# Patient Record
Sex: FEMALE | Race: WHITE | Employment: FULL TIME | URBAN - NONMETROPOLITAN AREA
[De-identification: names, ages, dates, MRNs, and addresses within clinical notes are randomized per-mention and may not be internally consistent; named-entity substitution may affect disease eponyms.]

---

## 2020-03-03 ENCOUNTER — TELEPHONE (OUTPATIENT)
Dept: FAMILY MEDICINE CLINIC | Age: 25
End: 2020-03-03

## 2020-03-03 ENCOUNTER — OFFICE VISIT (OUTPATIENT)
Dept: FAMILY MEDICINE CLINIC | Age: 25
End: 2020-03-03
Payer: COMMERCIAL

## 2020-03-03 VITALS
SYSTOLIC BLOOD PRESSURE: 134 MMHG | WEIGHT: 161.6 LBS | OXYGEN SATURATION: 99 % | BODY MASS INDEX: 23.93 KG/M2 | DIASTOLIC BLOOD PRESSURE: 82 MMHG | HEART RATE: 74 BPM | HEIGHT: 69 IN | TEMPERATURE: 98.3 F

## 2020-03-03 PROCEDURE — 1036F TOBACCO NON-USER: CPT | Performed by: FAMILY MEDICINE

## 2020-03-03 PROCEDURE — G8484 FLU IMMUNIZE NO ADMIN: HCPCS | Performed by: FAMILY MEDICINE

## 2020-03-03 PROCEDURE — G8420 CALC BMI NORM PARAMETERS: HCPCS | Performed by: FAMILY MEDICINE

## 2020-03-03 PROCEDURE — 99204 OFFICE O/P NEW MOD 45 MIN: CPT | Performed by: FAMILY MEDICINE

## 2020-03-03 PROCEDURE — G8427 DOCREV CUR MEDS BY ELIG CLIN: HCPCS | Performed by: FAMILY MEDICINE

## 2020-03-03 RX ORDER — MUPIROCIN CALCIUM 20 MG/G
CREAM TOPICAL
Qty: 15 G | Refills: 0 | Status: SHIPPED | OUTPATIENT
Start: 2020-03-03 | End: 2020-04-02

## 2020-03-03 SDOH — ECONOMIC STABILITY: TRANSPORTATION INSECURITY
IN THE PAST 12 MONTHS, HAS THE LACK OF TRANSPORTATION KEPT YOU FROM MEDICAL APPOINTMENTS OR FROM GETTING MEDICATIONS?: NO

## 2020-03-03 SDOH — ECONOMIC STABILITY: FOOD INSECURITY: WITHIN THE PAST 12 MONTHS, THE FOOD YOU BOUGHT JUST DIDN'T LAST AND YOU DIDN'T HAVE MONEY TO GET MORE.: NEVER TRUE

## 2020-03-03 SDOH — ECONOMIC STABILITY: TRANSPORTATION INSECURITY
IN THE PAST 12 MONTHS, HAS LACK OF TRANSPORTATION KEPT YOU FROM MEETINGS, WORK, OR FROM GETTING THINGS NEEDED FOR DAILY LIVING?: NO

## 2020-03-03 SDOH — ECONOMIC STABILITY: FOOD INSECURITY: WITHIN THE PAST 12 MONTHS, YOU WORRIED THAT YOUR FOOD WOULD RUN OUT BEFORE YOU GOT MONEY TO BUY MORE.: NEVER TRUE

## 2020-03-03 SDOH — ECONOMIC STABILITY: INCOME INSECURITY: HOW HARD IS IT FOR YOU TO PAY FOR THE VERY BASICS LIKE FOOD, HOUSING, MEDICAL CARE, AND HEATING?: NOT HARD AT ALL

## 2020-03-03 ASSESSMENT — PATIENT HEALTH QUESTIONNAIRE - PHQ9
SUM OF ALL RESPONSES TO PHQ9 QUESTIONS 1 & 2: 0
2. FEELING DOWN, DEPRESSED OR HOPELESS: 0
SUM OF ALL RESPONSES TO PHQ QUESTIONS 1-9: 0
1. LITTLE INTEREST OR PLEASURE IN DOING THINGS: 0
SUM OF ALL RESPONSES TO PHQ QUESTIONS 1-9: 0

## 2020-03-03 ASSESSMENT — ENCOUNTER SYMPTOMS
ABDOMINAL PAIN: 0
ABDOMINAL DISTENTION: 0
NAUSEA: 0
CONSTIPATION: 0
EYE DISCHARGE: 0
TROUBLE SWALLOWING: 0
VOICE CHANGE: 0
COLOR CHANGE: 0
SHORTNESS OF BREATH: 0
COUGH: 0
DIARRHEA: 0

## 2020-03-03 NOTE — TELEPHONE ENCOUNTER
[facility] calling ins won't cover Bactroban cream needs switched to ointment.  Ok to change per Dr Dayana Taylor

## 2020-03-03 NOTE — PROGRESS NOTES
Subjective:      Patient ID: Misha Austin is a 21 y.o. female who presents for:  Chief Complaint   Patient presents with    New Patient   [facility] - in [facility] 2019        Chest pain, sob, feeling of irregular heartbeat. No syncope, lightheadedness. She has had a 2D echo for this before. She has a stethoscope and she has been listening and she feels like she hears double beats and then pauses. She does not hear truly irregular beats. She does not feel irregular beats. There is no cardiac history relevant in the family. She does not smoke. L arm goes numb down to the pinky. No burning. Does not wake up with the symptoms. Not sure it happens with her chest pain. R handed. 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 She is noting she is getting crusting lesions in each side of her nose. She feels the crust off and they return. It is bilateral.  Is internal.  No bleeding. No obvious signs of infection. She notes she has a baseline history of anxiety and her mother and grandmother both have anxiety 2. She does note a number of the symptoms she has with her chest pain are associated with anxiety times and if she is active she forgets all about it and does not notice any discomfort. Current Outpatient Medications on File Prior to Visit   Medication Sig Dispense Refill    levonorgestrel-ethinyl estradiol (LUTERA) 0.1-20 MG-MCG per tablet Take 1 tablet by mouth daily. No current facility-administered medications on file prior to visit. No past medical history on file. No past surgical history on file.   Social History     Socioeconomic History    Marital status: Single     Spouse name: Not on file    Number of children: Not on file    Years of education: Not on file    Highest education level: Not on file   Occupational History    Not on file   Social Needs    Financial resource strain: Not hard at all   Pelon-Rafiq insecurity: S2 normal. No murmur. No friction rub. No gallop. No S3 sounds. Pulmonary:      Effort: Pulmonary effort is normal. No accessory muscle usage or respiratory distress. Breath sounds: Normal breath sounds. Chest:      Chest wall: No deformity. Abdominal:      General: There is no distension. Musculoskeletal:         General: No deformity. Cervical back: She exhibits normal range of motion, no tenderness and no deformity. Comments: Testing bilateral upper extremities for strength normal range of motion normal   Lymphadenopathy:      Cervical:      Right cervical: No superficial cervical adenopathy. Left cervical: No superficial cervical adenopathy. Upper Body:      Right upper body: No supraclavicular adenopathy. Left upper body: No supraclavicular adenopathy. Skin:     General: Skin is warm and dry. Findings: No bruising or rash. Nails: There is no clubbing. Neurological:      Mental Status: She is alert. Cranial Nerves: Cranial nerve deficit: CN II-XII GI without obvious deficit. Sensory: Sensory deficit: grossly intact for hands. Motor: No tremor. Atrophy: B UE and LE without atrophy. Abnormal muscle tone: B UE and LE have normal tone. Coordination: Coordination normal.      Gait: Gait normal.      Comments: Bilateral upper extremity sensation intact muscle strength normal   Psychiatric:         Attention and Perception: She is attentive. Mood and Affect: Mood is not anxious. Affect is not inappropriate. Speech: Speech normal.         Behavior: Behavior is not agitated. Behavior is cooperative. Judgment: Judgment normal.         No results found for this visit on 03/03/20. No results found for this or any previous visit (from the past 2016 hour(s)). Assessment:       Diagnosis Orders   1. Chest pain, unspecified type     2. Elevated BP without diagnosis of hypertension     3.  Nasal lesion  mupirocin (BACTROBAN) 2 % cream   4. Anxiety           No orders of the defined types were placed in this encounter. Plan:   Return in about 4 weeks (around 3/31/2020) for for review of outcome of today's recommendation. Patient Instructions   Increase exercise slowly to improve cardiovascular health    Log symptoms of anxiety and chest pain. Consider anxiety meds if needed    Recheck bp appt in 4 weeks    Treat nasal lesions as assumption of MRSA colonization from recent nursing rotations in training. Colton Gr M.D.

## 2020-10-01 ENCOUNTER — HOSPITAL ENCOUNTER (EMERGENCY)
Age: 25
Discharge: HOME | End: 2020-10-02
Attending: EMERGENCY MEDICINE
Payer: COMMERCIAL

## 2020-10-01 ENCOUNTER — APPOINTMENT (OUTPATIENT)
Dept: CT IMAGING | Age: 25
End: 2020-10-01
Payer: COMMERCIAL

## 2020-10-01 LAB
ALBUMIN SERPL-MCNC: 4.9 G/DL (ref 3.5–4.6)
ALP BLD-CCNC: 40 U/L (ref 40–130)
ALT SERPL-CCNC: 17 U/L (ref 0–33)
ANION GAP SERPL CALCULATED.3IONS-SCNC: 14 MEQ/L (ref 9–15)
AST SERPL-CCNC: 17 U/L (ref 0–35)
BASOPHILS ABSOLUTE: 0.1 K/UL (ref 0–0.2)
BASOPHILS RELATIVE PERCENT: 0.4 %
BILIRUB SERPL-MCNC: <0.2 MG/DL (ref 0.2–0.7)
BILIRUBIN URINE: NEGATIVE
BLOOD, URINE: NORMAL
BUN BLDV-MCNC: 12 MG/DL (ref 6–20)
CALCIUM SERPL-MCNC: 10 MG/DL (ref 8.5–9.9)
CHLORIDE BLD-SCNC: 101 MEQ/L (ref 95–107)
CLARITY: CLEAR
CO2: 24 MEQ/L (ref 20–31)
COLOR: YELLOW
CREAT SERPL-MCNC: 0.87 MG/DL (ref 0.5–0.9)
EOSINOPHILS ABSOLUTE: 0.5 K/UL (ref 0–0.7)
EOSINOPHILS RELATIVE PERCENT: 4.2 %
EPITHELIAL CELLS, UA: NORMAL /HPF
GFR AFRICAN AMERICAN: >60
GFR NON-AFRICAN AMERICAN: >60
GLOBULIN: 3.1 G/DL (ref 2.3–3.5)
GLUCOSE BLD-MCNC: 94 MG/DL (ref 70–99)
GLUCOSE URINE: NEGATIVE MG/DL
HCG QUALITATIVE: NEGATIVE
HCT VFR BLD CALC: 39.4 % (ref 37–47)
HEMOGLOBIN: 13.3 G/DL (ref 12–16)
KETONES, URINE: NEGATIVE MG/DL
LACTIC ACID: 1.8 MMOL/L (ref 0.5–2.2)
LEUKOCYTE ESTERASE, URINE: NEGATIVE
LYMPHOCYTES ABSOLUTE: 3.2 K/UL (ref 1–4.8)
LYMPHOCYTES RELATIVE PERCENT: 26.9 %
MCH RBC QN AUTO: 29.8 PG (ref 27–31.3)
MCHC RBC AUTO-ENTMCNC: 33.8 % (ref 33–37)
MCV RBC AUTO: 88.4 FL (ref 82–100)
MONOCYTES ABSOLUTE: 0.8 K/UL (ref 0.2–0.8)
MONOCYTES RELATIVE PERCENT: 6.8 %
NEUTROPHILS ABSOLUTE: 7.3 K/UL (ref 1.4–6.5)
NEUTROPHILS RELATIVE PERCENT: 61.7 %
NITRITE, URINE: NEGATIVE
PDW BLD-RTO: 12.5 % (ref 11.5–14.5)
PH UA: 6 (ref 5–9)
PLATELET # BLD: 314 K/UL (ref 130–400)
POTASSIUM SERPL-SCNC: 3.9 MEQ/L (ref 3.4–4.9)
PROTEIN UA: NEGATIVE MG/DL
RBC # BLD: 4.45 M/UL (ref 4.2–5.4)
RBC UA: NORMAL /HPF (ref 0–2)
SODIUM BLD-SCNC: 139 MEQ/L (ref 135–144)
SPECIFIC GRAVITY UA: 1.02 (ref 1–1.03)
TOTAL PROTEIN: 8 G/DL (ref 6.3–8)
URINE REFLEX TO CULTURE: NORMAL
UROBILINOGEN, URINE: 0.2 E.U./DL
WBC # BLD: 11.9 K/UL (ref 4.8–10.8)
WBC UA: NORMAL /HPF (ref 0–5)

## 2020-10-01 PROCEDURE — 84703 CHORIONIC GONADOTROPIN ASSAY: CPT

## 2020-10-01 PROCEDURE — 36415 COLL VENOUS BLD VENIPUNCTURE: CPT

## 2020-10-01 PROCEDURE — 99283 EMERGENCY DEPT VISIT LOW MDM: CPT

## 2020-10-01 PROCEDURE — 80053 COMPREHEN METABOLIC PANEL: CPT

## 2020-10-01 PROCEDURE — 85025 COMPLETE CBC W/AUTO DIFF WBC: CPT

## 2020-10-01 PROCEDURE — 81001 URINALYSIS AUTO W/SCOPE: CPT

## 2020-10-01 PROCEDURE — 99282 EMERGENCY DEPT VISIT SF MDM: CPT

## 2020-10-01 PROCEDURE — 2580000003 HC RX 258: Performed by: EMERGENCY MEDICINE

## 2020-10-01 PROCEDURE — 70450 CT HEAD/BRAIN W/O DYE: CPT

## 2020-10-01 PROCEDURE — 83605 ASSAY OF LACTIC ACID: CPT

## 2020-10-01 PROCEDURE — 84146 ASSAY OF PROLACTIN: CPT

## 2020-10-01 RX ORDER — 0.9 % SODIUM CHLORIDE 0.9 %
1000 INTRAVENOUS SOLUTION INTRAVENOUS ONCE
Status: COMPLETED | OUTPATIENT
Start: 2020-10-01 | End: 2020-10-02

## 2020-10-01 RX ORDER — SODIUM CHLORIDE 0.9 % (FLUSH) 0.9 %
3 SYRINGE (ML) INJECTION EVERY 8 HOURS
Status: DISCONTINUED | OUTPATIENT
Start: 2020-10-01 | End: 2020-10-02 | Stop reason: HOSPADM

## 2020-10-01 RX ADMIN — SODIUM CHLORIDE 1000 ML: 9 INJECTION, SOLUTION INTRAVENOUS at 23:18

## 2020-10-01 RX ADMIN — Medication 3 ML: at 23:18

## 2020-10-02 VITALS
HEART RATE: 105 BPM | BODY MASS INDEX: 28.14 KG/M2 | OXYGEN SATURATION: 97 % | HEIGHT: 69 IN | TEMPERATURE: 98.9 F | SYSTOLIC BLOOD PRESSURE: 144 MMHG | DIASTOLIC BLOOD PRESSURE: 83 MMHG | RESPIRATION RATE: 16 BRPM | WEIGHT: 190 LBS

## 2020-10-02 LAB — PROLACTIN: 160.8 NG/ML

## 2020-10-02 NOTE — ED NOTES
Patient up to BR with steady gait. No c/o dizziness while up to BR. States her legs ache. Vishal David.  Emely Ruiz, [address] [place] [address]  10/02/20 [id number]

## 2020-10-02 NOTE — ED TRIAGE NOTES
Patient sleeping with arms in air and clenched when BF woke up. Her legs began shaking and her breathing changed, he described. Upon arrival patient alert and oriented. Placed in gown and currently has tele on. IV established and labs obtained per Nacho Presley.

## 2020-10-02 NOTE — ED PROVIDER NOTES
Name: Rasheed Carrasco  Age: 31 y.o. Gender: female  CodeStatus: No Order  Allergies: No Known Allergies    Chief Complaint:Seizures (Seizure like activity)    Primary Care Provider: Faizan Arzate MD  Date of Service: [unfilled]     Past Medical History:   Diagnosis Date    Anxiety       History reviewed. No pertinent surgical history. Medications:  Reviewed    Infusion Medications:   Scheduled Medications:    sodium chloride flush  3 mL Intravenous Q8H     PRN Meds:     Subjective:     CC/HPI: 48-year-old female to the emergency department with chief complaint of a possible seizure. Patient arrived via EMS. No history of similar episodes no recent sickness illness etc.  Patient's boyfriend states that shortly after going to sleep he was awakened by her shaking and states that her arms were clenched up in her legs. Boyfriend estimates that it only lasted approximately 1 to 2 minutes. Afterwards patient was minimally responsive for a short period of time. Upon EMS arrival patient was combative and not answering questions and cooperating. Upon arrival to the emergency department patient was answering questions slowly. Patient denies headache any blurred vision double vision. Patient states she feels slightly lightheaded. VITALS/PMH/PSH: Reviewed per nurses notes    REVIEW OF SYSTEMS: As in chief complaint history of present illness, otherwise all other systems are reviewed and negative the total 10 systems reviewed    GEN: Pt alert and oriented, no acute distress. Slow to respond but answering questions appropriately. HEENT:         Normocephalic/Atramatic        PERRL, EOMI       EACs and TMs clear b/l       Throat nonerythematous or edematous. No exudates noted.   Moist membranes  NECK: Nontender, no signs of trauma, no lymphadenopathy  HEART: Reg S1/S2, without murmer, rub or gallop  LUNGS: Clear to auscultation bilaterally, respirations even and unlabored  ABDOMEN: Bowel sounds positive times 4, seizure    Disposition/plan;  Patient being discharged home in stable and improved condition. Given discharge instructions on seizures. Given a referral to neurology. Encouraged to return immediately for any worsening and or changes to symptoms.   Patient and patient's mother advised no driving until cleared by neurologist.  Patient voiced understanding  Electronically signed by Gregorio Rebolledo DO on 10/1/2020 at 11:55 PM      Gregorio Rebolledo DO  10/01/20 [id number]

## 2020-10-22 ENCOUNTER — HOSPITAL ENCOUNTER (OUTPATIENT)
Dept: MRI IMAGING | Age: 25
Discharge: HOME | End: 2020-10-24
Payer: COMMERCIAL

## 2020-10-22 ENCOUNTER — HOSPITAL ENCOUNTER (OUTPATIENT)
Dept: NEUROLOGY | Age: 25
Discharge: HOME | End: 2020-10-22
Payer: COMMERCIAL

## 2020-10-22 PROCEDURE — 70553 MRI BRAIN STEM W/O & W/DYE: CPT

## 2020-10-22 PROCEDURE — A9579 GAD-BASE MR CONTRAST NOS,1ML: HCPCS | Performed by: SPECIALIST

## 2020-10-22 PROCEDURE — 6360000004 HC RX CONTRAST MEDICATION: Performed by: SPECIALIST

## 2020-10-22 PROCEDURE — 95816 EEG AWAKE AND DROWSY: CPT

## 2020-10-22 RX ADMIN — GADOTERIDOL 15 ML: 279.3 INJECTION, SOLUTION INTRAVENOUS at 10:06

## 2020-10-23 NOTE — PROCEDURES
[address]                      [address], [address] [facility]                          ELECTROENCEPHALOGRAM REPORT    PATIENT NAME: Mario Herr                       :        1995  MED REC NO:   [id number]                            ROOM:  ACCOUNT NO:   [de-identified]                           ADMIT DATE: 10/22/2020  PROVIDER:     Andrew Correa MD    DATE OF EEG:  10/22/2020    REFERRING PROVIDER:  Andrew Correa MD    REASON FOR STUDY:  The patient has a history of anxiety and change in  the mental status. EEG FINDINGS:  This is a 20-channel EEG. The patient has a background  of 9 to 10 Hz alpha activity, which is moderately well-developed and  moderately well-organized. Cardiac monitor shows heart rate of 86 beats  per minute and is regular. Eye opening suppresses the background well. Artifacts appear due to the patient's movements, muscle activity and  electrodes. On hyperventilation, artifacts appear. No epileptic  discharges were seen during or after the hyperventilation. On photic  stimulation, good photic drive is seen. No epileptic discharges were  seen during or after the photic stimulation. During sleep, slowing of  the background activity appears. Muscle artifacts make the  interpretation difficult at times. IMPRESSION:  This is an essentially normal awake, drowsy, and sleep EEG. No definite epileptic discharges were seen. If clinically indicated, we  may repeat the EEG with sleep deprivation. Clinical correlation shall  be made.         Addie Fuller MD    D: 10/23/2020 17:25:21       T: 10/23/2020 18:02:58     [email or web address]  Job#: [id number]     Doc#: [id number]    CC:

## 2023-03-17 DIAGNOSIS — I10 PRIMARY HYPERTENSION: Primary | ICD-10-CM

## 2023-03-17 PROBLEM — R63.5 ABNORMAL WEIGHT GAIN: Status: ACTIVE | Noted: 2023-03-17

## 2023-03-17 PROBLEM — L65.9 HAIR LOSS: Status: ACTIVE | Noted: 2023-03-17

## 2023-03-17 PROBLEM — F41.9 ANXIETY: Status: ACTIVE | Noted: 2023-03-17

## 2023-03-17 PROBLEM — R56.9 SEIZURE (MULTI): Status: ACTIVE | Noted: 2023-03-17

## 2023-03-17 PROBLEM — E53.8 VITAMIN B12 DEFICIENCY: Status: ACTIVE | Noted: 2023-03-17

## 2023-03-17 PROBLEM — N76.0 BACTERIAL VAGINOSIS: Status: ACTIVE | Noted: 2023-03-17

## 2023-03-17 PROBLEM — J35.8 TONSIL STONE: Status: ACTIVE | Noted: 2023-03-17

## 2023-03-17 PROBLEM — R09.A2 GLOBUS SENSATION: Status: ACTIVE | Noted: 2023-03-17

## 2023-03-17 PROBLEM — J30.9 ALLERGIC RHINITIS: Status: ACTIVE | Noted: 2023-03-17

## 2023-03-17 PROBLEM — R07.9 CHEST PAIN: Status: ACTIVE | Noted: 2023-03-17

## 2023-03-17 PROBLEM — R03.0 ELEVATED BP WITHOUT DIAGNOSIS OF HYPERTENSION: Status: ACTIVE | Noted: 2023-03-17

## 2023-03-17 PROBLEM — H90.0 CONDUCTIVE HEARING LOSS, BILATERAL: Status: ACTIVE | Noted: 2023-03-17

## 2023-03-17 PROBLEM — J32.4 CHRONIC PANSINUSITIS: Status: ACTIVE | Noted: 2023-03-17

## 2023-03-17 PROBLEM — G47.00 INSOMNIA: Status: ACTIVE | Noted: 2023-03-17

## 2023-03-17 PROBLEM — N89.8 VAGINAL DISCHARGE: Status: ACTIVE | Noted: 2023-03-17

## 2023-03-17 PROBLEM — H66.93 BILATERAL CHRONIC OTITIS MEDIA: Status: ACTIVE | Noted: 2023-03-17

## 2023-03-17 PROBLEM — J32.9 CHRONIC SINUSITIS: Status: ACTIVE | Noted: 2023-03-17

## 2023-03-17 PROBLEM — B96.89 BACTERIAL VAGINOSIS: Status: ACTIVE | Noted: 2023-03-17

## 2023-03-17 RX ORDER — LISINOPRIL 10 MG/1
TABLET ORAL
Qty: 30 TABLET | Refills: 3 | Status: SHIPPED | OUTPATIENT
Start: 2023-03-17 | End: 2023-08-22

## 2023-03-17 RX ORDER — LISINOPRIL 10 MG/1
10 TABLET ORAL DAILY
COMMUNITY
End: 2023-03-17 | Stop reason: SDUPTHER

## 2023-03-17 RX ORDER — LAMOTRIGINE 100 MG/1
1 TABLET ORAL 2 TIMES DAILY
COMMUNITY

## 2023-06-29 ENCOUNTER — HOSPITAL ENCOUNTER
Dept: HOSPITAL 101 - US | Age: 28
Discharge: HOME | End: 2023-06-29
Payer: COMMERCIAL

## 2023-06-29 DIAGNOSIS — O26.91: Primary | ICD-10-CM

## 2023-06-29 DIAGNOSIS — Z3A.08: ICD-10-CM

## 2023-06-29 PROCEDURE — 76817 TRANSVAGINAL US OBSTETRIC: CPT

## 2023-07-13 ENCOUNTER — HOSPITAL ENCOUNTER
Dept: HOSPITAL 101 - LAB | Age: 28
Discharge: HOME | End: 2023-07-13
Payer: COMMERCIAL

## 2023-07-13 DIAGNOSIS — N91.2: ICD-10-CM

## 2023-07-13 DIAGNOSIS — Z34.81: Primary | ICD-10-CM

## 2023-07-13 LAB
ADD MANUAL DIFF: NO
HCT VFR BLD CALC: 38.4 % (ref 36–48)
HEMATOCRIT: 38.4 % (ref 36–48)
HEMOGLOBIN: 12.8 G/DL (ref 12–16)
IMMATURE GRANULOCYTES ABS AUTO: 0.02 10^3/UL (ref 0–0.03)
IMMATURE GRANULOCYTES PCT AUTO: 0.3 % (ref 0–0.5)
LYMPHOCYTES  ABSOLUTE AUTO: 1.6 10^3/UL (ref 1.2–3.8)
MCV RBC: 90.8 FL (ref 81–99)
MEAN CORPUSCULAR HEMOGLOBIN: 30.3 PG (ref 26.7–34)
MEAN CORPUSCULAR HGB CONC: 33.3 G/DL (ref 29.9–35.2)
MEAN CORPUSCULAR VOLUME: 90.8 FL (ref 81–99)
PLATELET # BLD: 260 10^3/UL (ref 150–450)
PLATELET COUNT: 260 10^3/UL (ref 150–450)
RED BLOOD COUNT: 4.23 10^6/UL (ref 4.2–5.4)
THYROID STIMULATING HORMONE: 2.38 UIU/ML (ref 0.36–3.74)
WBC # BLD: 7.1 10^3/UL (ref 4–11)
WHITE BLOOD COUNT: 7.1 10^3/UL (ref 4–11)

## 2023-07-13 PROCEDURE — 84443 ASSAY THYROID STIM HORMONE: CPT

## 2023-07-13 PROCEDURE — 86706 HEP B SURFACE ANTIBODY: CPT

## 2023-07-13 PROCEDURE — 36415 COLL VENOUS BLD VENIPUNCTURE: CPT

## 2023-07-13 PROCEDURE — 87086 URINE CULTURE/COLONY COUNT: CPT

## 2023-07-13 PROCEDURE — 86803 HEPATITIS C AB TEST: CPT

## 2023-07-13 PROCEDURE — 87389 HIV-1 AG W/HIV-1&-2 AB AG IA: CPT

## 2023-07-13 PROCEDURE — 83036 HEMOGLOBIN GLYCOSYLATED A1C: CPT

## 2023-07-13 PROCEDURE — 86850 RBC ANTIBODY SCREEN: CPT

## 2023-07-13 PROCEDURE — 86901 BLOOD TYPING SEROLOGIC RH(D): CPT

## 2023-07-13 PROCEDURE — 86900 BLOOD TYPING SEROLOGIC ABO: CPT

## 2023-07-13 PROCEDURE — 86592 SYPHILIS TEST NON-TREP QUAL: CPT

## 2023-07-13 PROCEDURE — 86762 RUBELLA ANTIBODY: CPT

## 2023-07-13 PROCEDURE — 85025 COMPLETE CBC W/AUTO DIFF WBC: CPT

## 2023-07-14 LAB — RUBELLA ANTIBODIES, IGG: 0.96 INDEX

## 2023-08-22 DIAGNOSIS — I10 PRIMARY HYPERTENSION: ICD-10-CM

## 2023-08-22 RX ORDER — LISINOPRIL 10 MG/1
TABLET ORAL
Qty: 30 TABLET | Refills: 6 | Status: SHIPPED | OUTPATIENT
Start: 2023-08-22 | End: 2023-10-13 | Stop reason: ENTERED-IN-ERROR

## 2023-08-23 ENCOUNTER — HOSPITAL ENCOUNTER (OUTPATIENT)
Dept: HOSPITAL 101 - LAB | Age: 28
Discharge: HOME | End: 2023-08-23
Payer: COMMERCIAL

## 2023-08-23 DIAGNOSIS — Z12.4: Primary | ICD-10-CM

## 2023-08-23 PROCEDURE — G0145 SCR C/V CYTO,THINLAYER,RESCR: HCPCS

## 2023-09-05 ENCOUNTER — HOSPITAL ENCOUNTER
Age: 28
Discharge: HOME | End: 2023-09-05
Payer: COMMERCIAL

## 2023-09-05 DIAGNOSIS — Z34.92: Primary | ICD-10-CM

## 2023-09-05 PROCEDURE — 36415 COLL VENOUS BLD VENIPUNCTURE: CPT

## 2023-09-05 PROCEDURE — 82105 ALPHA-FETOPROTEIN SERUM: CPT

## 2023-09-10 PROBLEM — E66.9 CLASS 1 OBESITY WITH BODY MASS INDEX (BMI) OF 32.0 TO 32.9 IN ADULT: Status: ACTIVE | Noted: 2023-09-10

## 2023-09-10 RX ORDER — METFORMIN HYDROCHLORIDE 500 MG/1
500 TABLET, EXTENDED RELEASE ORAL
COMMUNITY
Start: 2023-05-01 | End: 2023-10-13 | Stop reason: ALTCHOICE

## 2023-09-10 RX ORDER — ONDANSETRON 4 MG/1
8 TABLET, FILM COATED ORAL 2 TIMES DAILY PRN
COMMUNITY
Start: 2023-07-26

## 2023-09-10 RX ORDER — LEVOTHYROXINE SODIUM 25 UG/1
25 TABLET ORAL
COMMUNITY
Start: 2023-05-22 | End: 2023-10-13 | Stop reason: ALTCHOICE

## 2023-09-19 ENCOUNTER — HOSPITAL ENCOUNTER
Age: 28
Discharge: HOME | End: 2023-09-19
Payer: COMMERCIAL

## 2023-09-19 DIAGNOSIS — Z34.92: Primary | ICD-10-CM

## 2023-09-19 PROCEDURE — 76805 OB US >/= 14 WKS SNGL FETUS: CPT

## 2023-09-19 PROCEDURE — 76817 TRANSVAGINAL US OBSTETRIC: CPT

## 2023-10-13 ENCOUNTER — OFFICE VISIT (OUTPATIENT)
Dept: NEUROLOGY | Facility: CLINIC | Age: 28
End: 2023-10-13
Payer: COMMERCIAL

## 2023-10-13 DIAGNOSIS — R56.9 SEIZURE (MULTI): Primary | ICD-10-CM

## 2023-10-13 PROCEDURE — 99213 OFFICE O/P EST LOW 20 MIN: CPT | Performed by: STUDENT IN AN ORGANIZED HEALTH CARE EDUCATION/TRAINING PROGRAM

## 2023-10-13 PROCEDURE — 1036F TOBACCO NON-USER: CPT | Performed by: STUDENT IN AN ORGANIZED HEALTH CARE EDUCATION/TRAINING PROGRAM

## 2023-10-13 NOTE — LETTER
October 13, 2023     Patient: Grace Villarreal   YOB: 1995   Date of Visit: 10/13/2023       To Whom It May Concern:    Grace Villarreal was seen in my clinic on 10/13/2023 at 11:30 am. Please excuse Grace for her absence from work on this day to make the appointment.    If you have any questions or concerns, please don't hesitate to call.         Sincerely,         Sylwia Salinas MD        CC: No Recipients

## 2023-10-13 NOTE — PROGRESS NOTES
Gracie Villarreal is a 28 y.o. year old female who presents for follow-up of seizures.     She has had 2 seizure in the past, 10/1/2020 and then 11/17/2020. MRI brain non lesional. EEG wnl. She was started on lamotrigine and has been well controlled on 100mg BID since then. She has had no further seizures. She drives. 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     Patient Active Problem List   Diagnosis    Abnormal weight gain    Allergic rhinitis    Anxiety    Bacterial vaginosis    Benign essential hypertension    Bilateral chronic otitis media    Chest pain    Chronic pansinusitis    Chronic sinusitis    Conductive hearing loss, bilateral    Elevated BP without diagnosis of hypertension    Globus sensation    Hair loss    Insomnia    Seizure (CMS/HCC)    Tonsil stone    Vaginal discharge    Vitamin B12 deficiency    Class 1 obesity with body mass index (BMI) of 32.0 to 32.9 in adult     Objective   Neurological Exam  Mental Status  Awake, alert and oriented to person, place and time. Speech is normal.    Cranial Nerves  CN III, IV, VI: Extraocular movements intact bilaterally.  CN VII: Full and symmetric facial movement.  CN VIII: Hearing is normal.    Motor   Strength is 5/5 throughout all four extremities.    Coordination  Right: Finger-to-nose normal.Left: Finger-to-nose normal.    Physical Exam  Eyes:      Extraocular Movements: Extraocular movements intact.   Neurological:      Motor: Motor strength is normal.  Psychiatric:         Speech: Speech normal.       Assessment/Plan   Diagnoses and all orders for this visit:  Seizure (CMS/HCC)    Will continue lamotrigine 100mg BID    Ok to drive    Continue ASMs at least 5-10 years, if tapers off then will need 6-12 month driving restriction and there is risk for breakthrough seizure and more difficulty to control seizures if that occurs     Discussed seizure rescue medication, will hold off for now given her epilepsy control      Follow-up in 1 year

## 2023-10-16 ENCOUNTER — HOSPITAL ENCOUNTER
Age: 28
Discharge: HOME | End: 2023-10-16
Payer: COMMERCIAL

## 2023-10-16 DIAGNOSIS — Z3A.24: ICD-10-CM

## 2023-10-16 DIAGNOSIS — O35.03X0: ICD-10-CM

## 2023-10-16 DIAGNOSIS — Z36.2: Primary | ICD-10-CM

## 2023-10-16 PROCEDURE — 76816 OB US FOLLOW-UP PER FETUS: CPT

## 2023-10-26 DIAGNOSIS — I10 BENIGN ESSENTIAL HYPERTENSION: Primary | ICD-10-CM

## 2023-10-26 RX ORDER — LISINOPRIL 10 MG/1
10 TABLET ORAL DAILY
COMMUNITY
End: 2023-10-26 | Stop reason: SDUPTHER

## 2023-10-26 RX ORDER — LISINOPRIL 10 MG/1
10 TABLET ORAL DAILY
Qty: 90 TABLET | Refills: 3 | Status: SHIPPED | OUTPATIENT
Start: 2023-10-26

## 2023-10-30 ENCOUNTER — HOSPITAL ENCOUNTER (OUTPATIENT)
Age: 28
Setting detail: OBSERVATION
Discharge: HOME | End: 2023-10-30
Payer: COMMERCIAL

## 2023-10-30 VITALS — SYSTOLIC BLOOD PRESSURE: 143 MMHG | HEART RATE: 76 BPM | DIASTOLIC BLOOD PRESSURE: 74 MMHG

## 2023-10-30 DIAGNOSIS — O26.892: Primary | ICD-10-CM

## 2023-10-30 DIAGNOSIS — Z3A.26: ICD-10-CM

## 2023-10-30 DIAGNOSIS — R10.31: ICD-10-CM

## 2023-10-30 LAB — GLUCOSE URINE UA: NEGATIVE MG/DL

## 2023-10-30 PROCEDURE — G0378 HOSPITAL OBSERVATION PER HR: HCPCS

## 2023-10-30 PROCEDURE — 59025 FETAL NON-STRESS TEST: CPT

## 2023-10-30 PROCEDURE — 81003 URINALYSIS AUTO W/O SCOPE: CPT

## 2023-10-30 PROCEDURE — G0379 DIRECT REFER HOSPITAL OBSERV: HCPCS

## 2023-11-04 ENCOUNTER — HOSPITAL ENCOUNTER
Dept: HOSPITAL 101 - LAB | Age: 28
Discharge: HOME | End: 2023-11-04
Payer: COMMERCIAL

## 2023-11-04 DIAGNOSIS — Z34.92: Primary | ICD-10-CM

## 2023-11-04 LAB
ADD MANUAL DIFF: NO
GLUCOSE 1 HOUR: 94 MG/DL
HCT VFR BLD CALC: 33.2 % (ref 36–48)
HEMATOCRIT: 33.2 % (ref 36–48)
HEMOGLOBIN: 10.7 G/DL (ref 12–16)
IMMATURE GRANULOCYTES ABS AUTO: 0.05 10^3/UL (ref 0–0.03)
IMMATURE GRANULOCYTES PCT AUTO: 0.6 % (ref 0–0.5)
LYMPHOCYTES  ABSOLUTE AUTO: 1.3 10^3/UL (ref 1.2–3.8)
MCV RBC: 93.8 FL (ref 81–99)
MEAN CORPUSCULAR HEMOGLOBIN: 30.2 PG (ref 26.7–34)
MEAN CORPUSCULAR HGB CONC: 32.2 G/DL (ref 29.9–35.2)
MEAN CORPUSCULAR VOLUME: 93.8 FL (ref 81–99)
PLATELET # BLD: 250 10^3/UL (ref 150–450)
PLATELET COUNT: 250 10^3/UL (ref 150–450)
RED BLOOD COUNT: 3.54 10^6/UL (ref 4.2–5.4)
WBC # BLD: 9 10^3/UL (ref 4–11)
WHITE BLOOD COUNT: 9 10^3/UL (ref 4–11)

## 2023-11-04 PROCEDURE — 85025 COMPLETE CBC W/AUTO DIFF WBC: CPT

## 2023-11-04 PROCEDURE — 82950 GLUCOSE TEST: CPT

## 2023-11-04 PROCEDURE — 36415 COLL VENOUS BLD VENIPUNCTURE: CPT

## 2023-11-06 ENCOUNTER — HOSPITAL ENCOUNTER
Dept: HOSPITAL 101 - US | Age: 28
Discharge: HOME | End: 2023-11-06
Payer: COMMERCIAL

## 2023-11-06 DIAGNOSIS — Z36.2: Primary | ICD-10-CM

## 2023-11-06 DIAGNOSIS — O35.03X1: ICD-10-CM

## 2023-11-06 PROCEDURE — 76816 OB US FOLLOW-UP PER FETUS: CPT

## 2023-11-30 ENCOUNTER — HOSPITAL ENCOUNTER
Dept: HOSPITAL 101 - US | Age: 28
Discharge: HOME | End: 2023-11-30
Payer: COMMERCIAL

## 2023-11-30 DIAGNOSIS — O36.63X0: Primary | ICD-10-CM

## 2023-11-30 DIAGNOSIS — Q07.8: ICD-10-CM

## 2023-11-30 DIAGNOSIS — Z3A.30: ICD-10-CM

## 2023-11-30 PROCEDURE — 76816 OB US FOLLOW-UP PER FETUS: CPT

## 2023-12-23 ENCOUNTER — HOSPITAL ENCOUNTER
Dept: HOSPITAL 101 - FBCO | Age: 28
Discharge: HOME | End: 2023-12-23
Payer: COMMERCIAL

## 2023-12-23 VITALS — DIASTOLIC BLOOD PRESSURE: 63 MMHG | SYSTOLIC BLOOD PRESSURE: 113 MMHG | HEART RATE: 75 BPM

## 2023-12-23 DIAGNOSIS — O16.3: Primary | ICD-10-CM

## 2023-12-23 DIAGNOSIS — Z3A.33: ICD-10-CM

## 2023-12-23 PROCEDURE — 76818 FETAL BIOPHYS PROFILE W/NST: CPT

## 2023-12-23 PROCEDURE — 76816 OB US FOLLOW-UP PER FETUS: CPT

## 2023-12-27 ENCOUNTER — HOSPITAL ENCOUNTER
Dept: HOSPITAL 101 - FBCO | Age: 28
Discharge: HOME | End: 2023-12-27
Payer: COMMERCIAL

## 2023-12-27 VITALS — DIASTOLIC BLOOD PRESSURE: 68 MMHG | HEART RATE: 65 BPM | SYSTOLIC BLOOD PRESSURE: 123 MMHG

## 2023-12-27 DIAGNOSIS — O16.3: Primary | ICD-10-CM

## 2023-12-27 DIAGNOSIS — Z3A.00: ICD-10-CM

## 2023-12-27 PROCEDURE — 59025 FETAL NON-STRESS TEST: CPT

## 2023-12-30 ENCOUNTER — HOSPITAL ENCOUNTER
Dept: HOSPITAL 101 - US | Age: 28
Discharge: HOME | End: 2023-12-30
Payer: COMMERCIAL

## 2023-12-30 VITALS — HEART RATE: 76 BPM | SYSTOLIC BLOOD PRESSURE: 126 MMHG | DIASTOLIC BLOOD PRESSURE: 63 MMHG

## 2023-12-30 DIAGNOSIS — Z3A.34: ICD-10-CM

## 2023-12-30 DIAGNOSIS — O16.3: Primary | ICD-10-CM

## 2023-12-30 PROCEDURE — 76818 FETAL BIOPHYS PROFILE W/NST: CPT

## 2024-01-03 ENCOUNTER — HOSPITAL ENCOUNTER
Dept: HOSPITAL 101 - FBCO | Age: 29
Discharge: HOME | End: 2024-01-03
Payer: COMMERCIAL

## 2024-01-03 VITALS — SYSTOLIC BLOOD PRESSURE: 122 MMHG | HEART RATE: 67 BPM | DIASTOLIC BLOOD PRESSURE: 70 MMHG

## 2024-01-03 DIAGNOSIS — O16.3: Primary | ICD-10-CM

## 2024-01-03 DIAGNOSIS — Z3A.00: ICD-10-CM

## 2024-01-03 PROCEDURE — 96372 THER/PROPH/DIAG INJ SC/IM: CPT

## 2024-01-03 PROCEDURE — 59025 FETAL NON-STRESS TEST: CPT

## 2024-01-03 RX ADMIN — BETAMETHASONE SODIUM PHOSPHATE AND BETAMETHASONE ACETATE 12 MG: 3; 3 INJECTION, SUSPENSION INTRA-ARTICULAR; INTRALESIONAL; INTRAMUSCULAR at 19:53

## 2024-01-04 ENCOUNTER — HOSPITAL ENCOUNTER (OUTPATIENT)
Dept: HOSPITAL 101 - FBCO | Age: 29
Discharge: HOME | End: 2024-01-04
Payer: COMMERCIAL

## 2024-01-04 DIAGNOSIS — O13.9: Primary | ICD-10-CM

## 2024-01-04 PROCEDURE — 96372 THER/PROPH/DIAG INJ SC/IM: CPT

## 2024-01-04 RX ADMIN — BETAMETHASONE SODIUM PHOSPHATE AND BETAMETHASONE ACETATE 12 MG: 3; 3 INJECTION, SUSPENSION INTRA-ARTICULAR; INTRALESIONAL; INTRAMUSCULAR at 19:04

## 2024-01-06 ENCOUNTER — HOSPITAL ENCOUNTER
Dept: HOSPITAL 101 - US | Age: 29
Discharge: HOME | End: 2024-01-06
Payer: COMMERCIAL

## 2024-01-06 VITALS — DIASTOLIC BLOOD PRESSURE: 57 MMHG | HEART RATE: 74 BPM | SYSTOLIC BLOOD PRESSURE: 115 MMHG

## 2024-01-06 DIAGNOSIS — Z3A.35: ICD-10-CM

## 2024-01-06 DIAGNOSIS — O16.3: Primary | ICD-10-CM

## 2024-01-06 PROCEDURE — 76818 FETAL BIOPHYS PROFILE W/NST: CPT

## 2024-01-10 ENCOUNTER — HOSPITAL ENCOUNTER
Dept: HOSPITAL 101 - FBCO | Age: 29
Discharge: HOME | End: 2024-01-10
Payer: COMMERCIAL

## 2024-01-10 ENCOUNTER — HOSPITAL ENCOUNTER (OUTPATIENT)
Dept: HOSPITAL 101 - LAB | Age: 29
Discharge: HOME | End: 2024-01-10
Payer: COMMERCIAL

## 2024-01-10 VITALS — DIASTOLIC BLOOD PRESSURE: 67 MMHG | HEART RATE: 68 BPM | SYSTOLIC BLOOD PRESSURE: 113 MMHG

## 2024-01-10 DIAGNOSIS — O10.919: Primary | ICD-10-CM

## 2024-01-10 DIAGNOSIS — Z3A.00: ICD-10-CM

## 2024-01-10 DIAGNOSIS — O36.60X0: ICD-10-CM

## 2024-01-10 DIAGNOSIS — Z34.93: Primary | ICD-10-CM

## 2024-01-10 PROCEDURE — 59025 FETAL NON-STRESS TEST: CPT

## 2024-01-10 PROCEDURE — 87081 CULTURE SCREEN ONLY: CPT

## 2024-01-13 ENCOUNTER — HOSPITAL ENCOUNTER
Dept: HOSPITAL 101 - US | Age: 29
Discharge: HOME | End: 2024-01-13
Payer: COMMERCIAL

## 2024-01-13 ENCOUNTER — HOSPITAL ENCOUNTER
Dept: HOSPITAL 101 - FBCO | Age: 29
Discharge: HOME | End: 2024-01-13
Payer: COMMERCIAL

## 2024-01-13 VITALS — DIASTOLIC BLOOD PRESSURE: 66 MMHG | HEART RATE: 68 BPM | SYSTOLIC BLOOD PRESSURE: 117 MMHG

## 2024-01-13 DIAGNOSIS — Z3A.00: ICD-10-CM

## 2024-01-13 DIAGNOSIS — O36.63X0: Primary | ICD-10-CM

## 2024-01-13 DIAGNOSIS — O16.3: Primary | ICD-10-CM

## 2024-01-13 DIAGNOSIS — O36.63X0: ICD-10-CM

## 2024-01-13 PROCEDURE — 59025 FETAL NON-STRESS TEST: CPT

## 2024-01-13 PROCEDURE — 76818 FETAL BIOPHYS PROFILE W/NST: CPT

## 2024-01-17 ENCOUNTER — HOSPITAL ENCOUNTER (INPATIENT)
Age: 29
LOS: 2 days | Discharge: HOME | End: 2024-01-19
Payer: COMMERCIAL

## 2024-01-17 VITALS
RESPIRATION RATE: 16 BRPM | DIASTOLIC BLOOD PRESSURE: 71 MMHG | OXYGEN SATURATION: 96 % | HEART RATE: 64 BPM | SYSTOLIC BLOOD PRESSURE: 117 MMHG

## 2024-01-17 VITALS
RESPIRATION RATE: 18 BRPM | HEART RATE: 57 BPM | DIASTOLIC BLOOD PRESSURE: 73 MMHG | OXYGEN SATURATION: 97 % | SYSTOLIC BLOOD PRESSURE: 111 MMHG

## 2024-01-17 VITALS
SYSTOLIC BLOOD PRESSURE: 116 MMHG | DIASTOLIC BLOOD PRESSURE: 58 MMHG | OXYGEN SATURATION: 98 % | HEART RATE: 64 BPM | RESPIRATION RATE: 22 BRPM

## 2024-01-17 VITALS
SYSTOLIC BLOOD PRESSURE: 132 MMHG | DIASTOLIC BLOOD PRESSURE: 79 MMHG | HEART RATE: 81 BPM | TEMPERATURE: 98 F | RESPIRATION RATE: 18 BRPM

## 2024-01-17 VITALS — RESPIRATION RATE: 20 BRPM | HEART RATE: 62 BPM | OXYGEN SATURATION: 98 %

## 2024-01-17 VITALS
RESPIRATION RATE: 21 BRPM | OXYGEN SATURATION: 98 % | HEART RATE: 63 BPM | DIASTOLIC BLOOD PRESSURE: 91 MMHG | SYSTOLIC BLOOD PRESSURE: 131 MMHG

## 2024-01-17 VITALS — HEART RATE: 62 BPM | OXYGEN SATURATION: 98 % | RESPIRATION RATE: 21 BRPM

## 2024-01-17 VITALS
DIASTOLIC BLOOD PRESSURE: 73 MMHG | RESPIRATION RATE: 18 BRPM | HEART RATE: 64 BPM | SYSTOLIC BLOOD PRESSURE: 122 MMHG | OXYGEN SATURATION: 98 %

## 2024-01-17 VITALS — TEMPERATURE: 98 F | RESPIRATION RATE: 16 BRPM

## 2024-01-17 VITALS — OXYGEN SATURATION: 96 % | RESPIRATION RATE: 16 BRPM

## 2024-01-17 VITALS
DIASTOLIC BLOOD PRESSURE: 62 MMHG | HEART RATE: 62 BPM | RESPIRATION RATE: 17 BRPM | SYSTOLIC BLOOD PRESSURE: 117 MMHG | OXYGEN SATURATION: 96 %

## 2024-01-17 VITALS — RESPIRATION RATE: 17 BRPM | HEART RATE: 64 BPM | OXYGEN SATURATION: 96 %

## 2024-01-17 VITALS — HEART RATE: 61 BPM | RESPIRATION RATE: 11 BRPM | OXYGEN SATURATION: 99 %

## 2024-01-17 VITALS
OXYGEN SATURATION: 92 % | SYSTOLIC BLOOD PRESSURE: 120 MMHG | HEART RATE: 63 BPM | RESPIRATION RATE: 16 BRPM | DIASTOLIC BLOOD PRESSURE: 77 MMHG

## 2024-01-17 VITALS
DIASTOLIC BLOOD PRESSURE: 80 MMHG | HEART RATE: 60 BPM | OXYGEN SATURATION: 96 % | SYSTOLIC BLOOD PRESSURE: 122 MMHG | RESPIRATION RATE: 15 BRPM

## 2024-01-17 VITALS — HEART RATE: 59 BPM | RESPIRATION RATE: 18 BRPM | OXYGEN SATURATION: 99 %

## 2024-01-17 VITALS — OXYGEN SATURATION: 95 % | RESPIRATION RATE: 23 BRPM | HEART RATE: 58 BPM

## 2024-01-17 VITALS — RESPIRATION RATE: 20 BRPM | OXYGEN SATURATION: 97 % | HEART RATE: 59 BPM

## 2024-01-17 VITALS
OXYGEN SATURATION: 98 % | DIASTOLIC BLOOD PRESSURE: 69 MMHG | SYSTOLIC BLOOD PRESSURE: 99 MMHG | RESPIRATION RATE: 16 BRPM | HEART RATE: 62 BPM

## 2024-01-17 VITALS — HEART RATE: 62 BPM | RESPIRATION RATE: 17 BRPM | OXYGEN SATURATION: 98 %

## 2024-01-17 VITALS
HEART RATE: 61 BPM | SYSTOLIC BLOOD PRESSURE: 124 MMHG | RESPIRATION RATE: 16 BRPM | OXYGEN SATURATION: 95 % | DIASTOLIC BLOOD PRESSURE: 69 MMHG

## 2024-01-17 VITALS — OXYGEN SATURATION: 97 % | TEMPERATURE: 98.8 F | RESPIRATION RATE: 18 BRPM | HEART RATE: 74 BPM

## 2024-01-17 VITALS
HEART RATE: 58 BPM | RESPIRATION RATE: 12 BRPM | SYSTOLIC BLOOD PRESSURE: 126 MMHG | OXYGEN SATURATION: 97 % | DIASTOLIC BLOOD PRESSURE: 76 MMHG

## 2024-01-17 VITALS
HEART RATE: 56 BPM | OXYGEN SATURATION: 98 % | DIASTOLIC BLOOD PRESSURE: 69 MMHG | RESPIRATION RATE: 14 BRPM | SYSTOLIC BLOOD PRESSURE: 122 MMHG

## 2024-01-17 VITALS
HEART RATE: 69 BPM | OXYGEN SATURATION: 96 % | DIASTOLIC BLOOD PRESSURE: 71 MMHG | SYSTOLIC BLOOD PRESSURE: 117 MMHG | RESPIRATION RATE: 19 BRPM

## 2024-01-17 VITALS — OXYGEN SATURATION: 98 % | HEART RATE: 65 BPM | RESPIRATION RATE: 16 BRPM

## 2024-01-17 VITALS — RESPIRATION RATE: 16 BRPM | HEART RATE: 60 BPM | OXYGEN SATURATION: 96 %

## 2024-01-17 VITALS
DIASTOLIC BLOOD PRESSURE: 67 MMHG | SYSTOLIC BLOOD PRESSURE: 116 MMHG | OXYGEN SATURATION: 96 % | HEART RATE: 64 BPM | RESPIRATION RATE: 17 BRPM

## 2024-01-17 VITALS — OXYGEN SATURATION: 97 % | HEART RATE: 62 BPM | RESPIRATION RATE: 18 BRPM

## 2024-01-17 VITALS — DIASTOLIC BLOOD PRESSURE: 84 MMHG | SYSTOLIC BLOOD PRESSURE: 132 MMHG

## 2024-01-17 VITALS
HEART RATE: 63 BPM | RESPIRATION RATE: 22 BRPM | SYSTOLIC BLOOD PRESSURE: 114 MMHG | TEMPERATURE: 98.06 F | OXYGEN SATURATION: 96 % | DIASTOLIC BLOOD PRESSURE: 63 MMHG

## 2024-01-17 VITALS — OXYGEN SATURATION: 96 % | HEART RATE: 64 BPM | RESPIRATION RATE: 15 BRPM

## 2024-01-17 VITALS — SYSTOLIC BLOOD PRESSURE: 139 MMHG | DIASTOLIC BLOOD PRESSURE: 81 MMHG

## 2024-01-17 VITALS — RESPIRATION RATE: 15 BRPM | OXYGEN SATURATION: 97 % | HEART RATE: 59 BPM

## 2024-01-17 VITALS — HEART RATE: 60 BPM | OXYGEN SATURATION: 98 % | RESPIRATION RATE: 23 BRPM

## 2024-01-17 VITALS — OXYGEN SATURATION: 97 % | RESPIRATION RATE: 19 BRPM | HEART RATE: 58 BPM

## 2024-01-17 VITALS — RESPIRATION RATE: 16 BRPM

## 2024-01-17 VITALS — OXYGEN SATURATION: 97 % | HEART RATE: 56 BPM | RESPIRATION RATE: 20 BRPM

## 2024-01-17 VITALS — OXYGEN SATURATION: 98 %

## 2024-01-17 VITALS — OXYGEN SATURATION: 91 % | RESPIRATION RATE: 18 BRPM | HEART RATE: 70 BPM

## 2024-01-17 VITALS — HEART RATE: 59 BPM | OXYGEN SATURATION: 97 % | RESPIRATION RATE: 20 BRPM

## 2024-01-17 VITALS
DIASTOLIC BLOOD PRESSURE: 69 MMHG | HEART RATE: 68 BPM | RESPIRATION RATE: 22 BRPM | OXYGEN SATURATION: 96 % | SYSTOLIC BLOOD PRESSURE: 124 MMHG

## 2024-01-17 VITALS — OXYGEN SATURATION: 98 % | RESPIRATION RATE: 17 BRPM | HEART RATE: 60 BPM

## 2024-01-17 VITALS — HEART RATE: 66 BPM | OXYGEN SATURATION: 96 % | SYSTOLIC BLOOD PRESSURE: 114 MMHG | DIASTOLIC BLOOD PRESSURE: 63 MMHG

## 2024-01-17 VITALS — OXYGEN SATURATION: 98 % | RESPIRATION RATE: 20 BRPM | HEART RATE: 66 BPM

## 2024-01-17 VITALS — RESPIRATION RATE: 17 BRPM | HEART RATE: 59 BPM | OXYGEN SATURATION: 97 %

## 2024-01-17 VITALS — RESPIRATION RATE: 16 BRPM | TEMPERATURE: 98.1 F

## 2024-01-17 VITALS — HEART RATE: 70 BPM | RESPIRATION RATE: 24 BRPM | OXYGEN SATURATION: 96 %

## 2024-01-17 VITALS — RESPIRATION RATE: 10 BRPM | HEART RATE: 59 BPM | OXYGEN SATURATION: 98 %

## 2024-01-17 VITALS — OXYGEN SATURATION: 97 % | HEART RATE: 60 BPM | RESPIRATION RATE: 17 BRPM

## 2024-01-17 VITALS — HEART RATE: 60 BPM | RESPIRATION RATE: 20 BRPM | OXYGEN SATURATION: 98 %

## 2024-01-17 VITALS — OXYGEN SATURATION: 98 % | HEART RATE: 71 BPM | RESPIRATION RATE: 19 BRPM

## 2024-01-17 VITALS — RESPIRATION RATE: 14 BRPM | OXYGEN SATURATION: 97 % | HEART RATE: 62 BPM

## 2024-01-17 VITALS — SYSTOLIC BLOOD PRESSURE: 132 MMHG | DIASTOLIC BLOOD PRESSURE: 79 MMHG

## 2024-01-17 DIAGNOSIS — Z3A.37: ICD-10-CM

## 2024-01-17 DIAGNOSIS — R56.9: ICD-10-CM

## 2024-01-17 LAB
ADD MANUAL DIFF: NO
CANNABINOID SCREEN URINE: NEGATIVE
HCT VFR BLD CALC: 33.6 % (ref 36–48)
HEMATOCRIT: 33.6 % (ref 36–48)
HEMOGLOBIN: 10.9 G/DL (ref 12–16)
IMMATURE GRANULOCYTES ABS AUTO: 0.05 10^3/UL (ref 0–0.03)
IMMATURE GRANULOCYTES PCT AUTO: 0.5 % (ref 0–0.5)
LYMPHOCYTES  ABSOLUTE AUTO: 2.7 10^3/UL (ref 1.2–3.8)
MCV RBC: 90.1 FL (ref 81–99)
MEAN CORPUSCULAR HEMOGLOBIN: 29.2 PG (ref 26.7–34)
MEAN CORPUSCULAR HGB CONC: 32.4 G/DL (ref 29.9–35.2)
MEAN CORPUSCULAR VOLUME: 90.1 FL (ref 81–99)
METHAMPHETAMINES SCREEN URINE: NEGATIVE
PLATELET # BLD: 286 10^3/UL (ref 150–450)
PLATELET COUNT: 286 10^3/UL (ref 150–450)
RED BLOOD COUNT: 3.73 10^6/UL (ref 4.2–5.4)
TRICYCLIC ANTIDEPRESSANT URINE: NEGATIVE
WBC # BLD: 10.4 10^3/UL (ref 4–11)
WHITE BLOOD COUNT: 10.4 10^3/UL (ref 4–11)

## 2024-01-17 PROCEDURE — 36415 COLL VENOUS BLD VENIPUNCTURE: CPT

## 2024-01-17 PROCEDURE — 96376 TX/PRO/DX INJ SAME DRUG ADON: CPT

## 2024-01-17 PROCEDURE — 51702 INSERT TEMP BLADDER CATH: CPT

## 2024-01-17 PROCEDURE — 88307 TISSUE EXAM BY PATHOLOGIST: CPT

## 2024-01-17 PROCEDURE — 96375 TX/PRO/DX INJ NEW DRUG ADDON: CPT

## 2024-01-17 PROCEDURE — 86850 RBC ANTIBODY SCREEN: CPT

## 2024-01-17 PROCEDURE — 86900 BLOOD TYPING SEROLOGIC ABO: CPT

## 2024-01-17 PROCEDURE — 80307 DRUG TEST PRSMV CHEM ANLYZR: CPT

## 2024-01-17 PROCEDURE — 86901 BLOOD TYPING SEROLOGIC RH(D): CPT

## 2024-01-17 PROCEDURE — 96372 THER/PROPH/DIAG INJ SC/IM: CPT

## 2024-01-17 PROCEDURE — 85025 COMPLETE CBC W/AUTO DIFF WBC: CPT

## 2024-01-17 PROCEDURE — 96374 THER/PROPH/DIAG INJ IV PUSH: CPT

## 2024-01-18 VITALS
HEART RATE: 68 BPM | TEMPERATURE: 98.06 F | SYSTOLIC BLOOD PRESSURE: 128 MMHG | DIASTOLIC BLOOD PRESSURE: 62 MMHG | RESPIRATION RATE: 16 BRPM

## 2024-01-18 VITALS
HEART RATE: 62 BPM | TEMPERATURE: 98 F | SYSTOLIC BLOOD PRESSURE: 120 MMHG | DIASTOLIC BLOOD PRESSURE: 80 MMHG | RESPIRATION RATE: 18 BRPM

## 2024-01-18 VITALS — RESPIRATION RATE: 16 BRPM | HEART RATE: 80 BPM | TEMPERATURE: 97.7 F

## 2024-01-18 VITALS — SYSTOLIC BLOOD PRESSURE: 106 MMHG | HEART RATE: 76 BPM | DIASTOLIC BLOOD PRESSURE: 70 MMHG | RESPIRATION RATE: 16 BRPM

## 2024-01-18 LAB
ADD MANUAL DIFF: NO
HCT VFR BLD CALC: 29.8 % (ref 36–48)
HEMATOCRIT: 29.8 % (ref 36–48)
HEMOGLOBIN: 9.5 G/DL (ref 12–16)
IMMATURE GRANULOCYTES ABS AUTO: 0.05 10^3/UL (ref 0–0.03)
IMMATURE GRANULOCYTES PCT AUTO: 0.5 % (ref 0–0.5)
LYMPHOCYTES  ABSOLUTE AUTO: 2.8 10^3/UL (ref 1.2–3.8)
MCV RBC: 92 FL (ref 81–99)
MEAN CORPUSCULAR HEMOGLOBIN: 29.3 PG (ref 26.7–34)
MEAN CORPUSCULAR HGB CONC: 31.9 G/DL (ref 29.9–35.2)
MEAN CORPUSCULAR VOLUME: 92 FL (ref 81–99)
PLATELET # BLD: 212 10^3/UL (ref 150–450)
PLATELET COUNT: 212 10^3/UL (ref 150–450)
RED BLOOD COUNT: 3.24 10^6/UL (ref 4.2–5.4)
WBC # BLD: 9.7 10^3/UL (ref 4–11)
WHITE BLOOD COUNT: 9.7 10^3/UL (ref 4–11)

## 2024-01-19 VITALS
SYSTOLIC BLOOD PRESSURE: 124 MMHG | TEMPERATURE: 97.7 F | DIASTOLIC BLOOD PRESSURE: 89 MMHG | HEART RATE: 69 BPM | RESPIRATION RATE: 16 BRPM

## 2024-01-19 VITALS
RESPIRATION RATE: 16 BRPM | DIASTOLIC BLOOD PRESSURE: 84 MMHG | SYSTOLIC BLOOD PRESSURE: 117 MMHG | HEART RATE: 80 BPM | TEMPERATURE: 97.4 F

## 2024-01-19 VITALS — HEART RATE: 81 BPM | TEMPERATURE: 97.52 F | DIASTOLIC BLOOD PRESSURE: 80 MMHG | SYSTOLIC BLOOD PRESSURE: 136 MMHG

## 2024-01-19 VITALS — RESPIRATION RATE: 16 BRPM

## 2024-01-24 ENCOUNTER — HOSPITAL ENCOUNTER
Age: 29
Discharge: HOME | End: 2024-01-24
Payer: COMMERCIAL

## 2024-01-24 VITALS
OXYGEN SATURATION: 96 % | TEMPERATURE: 98.24 F | SYSTOLIC BLOOD PRESSURE: 123 MMHG | DIASTOLIC BLOOD PRESSURE: 78 MMHG | HEART RATE: 69 BPM

## 2024-05-01 ENCOUNTER — HOSPITAL ENCOUNTER (EMERGENCY)
Age: 29
Discharge: HOME | End: 2024-05-01
Payer: COMMERCIAL

## 2024-05-01 VITALS — SYSTOLIC BLOOD PRESSURE: 132 MMHG | DIASTOLIC BLOOD PRESSURE: 72 MMHG

## 2024-05-01 VITALS — HEART RATE: 59 BPM

## 2024-05-01 VITALS — BODY MASS INDEX: 28.2 KG/M2

## 2024-05-01 VITALS — HEART RATE: 69 BPM

## 2024-05-01 VITALS
SYSTOLIC BLOOD PRESSURE: 153 MMHG | OXYGEN SATURATION: 98 % | DIASTOLIC BLOOD PRESSURE: 90 MMHG | HEART RATE: 72 BPM | TEMPERATURE: 98.1 F

## 2024-05-01 VITALS — HEART RATE: 60 BPM

## 2024-05-01 VITALS — OXYGEN SATURATION: 96 % | DIASTOLIC BLOOD PRESSURE: 90 MMHG | SYSTOLIC BLOOD PRESSURE: 153 MMHG

## 2024-05-01 VITALS — HEART RATE: 62 BPM

## 2024-05-01 VITALS — HEART RATE: 58 BPM

## 2024-05-01 DIAGNOSIS — Z79.899: ICD-10-CM

## 2024-05-01 DIAGNOSIS — I49.3: Primary | ICD-10-CM

## 2024-05-01 LAB
ADD MANUAL DIFF: NO
ANION GAP: 12.4
BLOOD UREA NITROGEN: 15 MG/DL (ref 7–18)
CALCIUM: 9.4 MG/DL (ref 8.5–10.1)
CARBON DIOXIDE: 26.8 MMOL/L (ref 21–32)
CHLORIDE: 105 MMOL/L (ref 98–107)
ESTIMATED GFR (AFRICAN AMERICA: >60 (ref 60–?)
ESTIMATED GFR (NON-AFRICAN AME: >60 (ref 60–?)
GLUCOSE: 97 MG/DL (ref 74–106)
HEMATOCRIT: 35.2 % (ref 36–48)
HEMOGLOBIN: 11.4 G/DL (ref 12–16)
IMMATURE GRANULOCYTES ABS AUTO: 0.01 10^3/UL (ref 0–0.03)
IMMATURE GRANULOCYTES PCT AUTO: 0.1 % (ref 0–0.5)
LYMPHOCYTES  ABSOLUTE AUTO: 2.8 10^3/UL (ref 1.2–3.8)
MAGNESIUM: 2 MG/DL (ref 1.8–2.4)
MCV RBC: 88.9 FL (ref 81–99)
MEAN CORPUSCULAR HEMOGLOBIN: 28.8 PG (ref 26.7–34)
MEAN CORPUSCULAR HGB CONC: 32.4 G/DL (ref 29.9–35.2)
PLATELET COUNT: 248 10^3/UL (ref 150–450)
POTASSIUM: 4.2 MMOL/L (ref 3.5–5.1)
RED BLOOD COUNT: 3.96 10^6/UL (ref 4.2–5.4)
SODIUM: 140 MMOL/L (ref 136–145)
WHITE BLOOD COUNT: 7.9 10^3/UL (ref 4–11)

## 2024-05-01 PROCEDURE — 80048 BASIC METABOLIC PNL TOTAL CA: CPT

## 2024-05-01 PROCEDURE — 93005 ELECTROCARDIOGRAM TRACING: CPT

## 2024-05-01 PROCEDURE — 85025 COMPLETE CBC W/AUTO DIFF WBC: CPT

## 2024-05-01 PROCEDURE — 83735 ASSAY OF MAGNESIUM: CPT

## 2024-05-01 PROCEDURE — 36415 COLL VENOUS BLD VENIPUNCTURE: CPT

## 2024-05-01 PROCEDURE — 99284 EMERGENCY DEPT VISIT MOD MDM: CPT

## 2024-05-03 ENCOUNTER — HOSPITAL ENCOUNTER (EMERGENCY)
Age: 29
Discharge: HOME | End: 2024-05-03
Payer: COMMERCIAL

## 2024-05-03 VITALS — BODY MASS INDEX: 28.1 KG/M2

## 2024-05-03 VITALS — HEART RATE: 63 BPM | OXYGEN SATURATION: 100 % | SYSTOLIC BLOOD PRESSURE: 166 MMHG | DIASTOLIC BLOOD PRESSURE: 94 MMHG

## 2024-05-03 VITALS — HEART RATE: 61 BPM | SYSTOLIC BLOOD PRESSURE: 131 MMHG | DIASTOLIC BLOOD PRESSURE: 85 MMHG | OXYGEN SATURATION: 100 %

## 2024-05-03 VITALS — DIASTOLIC BLOOD PRESSURE: 81 MMHG | HEART RATE: 60 BPM | SYSTOLIC BLOOD PRESSURE: 138 MMHG | OXYGEN SATURATION: 100 %

## 2024-05-03 VITALS — HEART RATE: 60 BPM | DIASTOLIC BLOOD PRESSURE: 93 MMHG | OXYGEN SATURATION: 100 % | SYSTOLIC BLOOD PRESSURE: 177 MMHG

## 2024-05-03 VITALS — HEART RATE: 65 BPM | OXYGEN SATURATION: 97 %

## 2024-05-03 VITALS — OXYGEN SATURATION: 100 % | HEART RATE: 64 BPM | DIASTOLIC BLOOD PRESSURE: 82 MMHG | SYSTOLIC BLOOD PRESSURE: 130 MMHG

## 2024-05-03 VITALS — TEMPERATURE: 98.3 F

## 2024-05-03 VITALS — DIASTOLIC BLOOD PRESSURE: 83 MMHG | SYSTOLIC BLOOD PRESSURE: 133 MMHG

## 2024-05-03 VITALS — SYSTOLIC BLOOD PRESSURE: 150 MMHG | DIASTOLIC BLOOD PRESSURE: 94 MMHG

## 2024-05-03 VITALS — DIASTOLIC BLOOD PRESSURE: 93 MMHG | SYSTOLIC BLOOD PRESSURE: 177 MMHG | OXYGEN SATURATION: 99 %

## 2024-05-03 VITALS — HEART RATE: 62 BPM | OXYGEN SATURATION: 100 %

## 2024-05-03 VITALS — SYSTOLIC BLOOD PRESSURE: 149 MMHG | HEART RATE: 60 BPM | DIASTOLIC BLOOD PRESSURE: 82 MMHG | OXYGEN SATURATION: 100 %

## 2024-05-03 VITALS — HEART RATE: 68 BPM

## 2024-05-03 VITALS — OXYGEN SATURATION: 99 %

## 2024-05-03 DIAGNOSIS — I49.3: Primary | ICD-10-CM

## 2024-05-03 DIAGNOSIS — Z79.899: ICD-10-CM

## 2024-05-03 LAB
ADD MANUAL DIFF: NO
ANION GAP: 11.2
BLOOD UREA NITROGEN: 12 MG/DL (ref 7–18)
CALCIUM: 9.8 MG/DL (ref 8.5–10.1)
CARBON DIOXIDE: 28.1 MMOL/L (ref 21–32)
CHLORIDE: 104 MMOL/L (ref 98–107)
ESTIMATED GFR (AFRICAN AMERICA: >60 (ref 60–?)
ESTIMATED GFR (NON-AFRICAN AME: >60 (ref 60–?)
GLUCOSE: 85 MG/DL (ref 74–106)
HEMATOCRIT: 36.7 % (ref 36–48)
HEMOGLOBIN: 11.7 G/DL (ref 12–16)
IMMATURE GRANULOCYTES ABS AUTO: 0.02 10^3/UL (ref 0–0.03)
IMMATURE GRANULOCYTES PCT AUTO: 0.3 % (ref 0–0.5)
LYMPHOCYTES  ABSOLUTE AUTO: 2.8 10^3/UL (ref 1.2–3.8)
MAGNESIUM: 2 MG/DL (ref 1.8–2.4)
MCV RBC: 88.4 FL (ref 81–99)
MEAN CORPUSCULAR HEMOGLOBIN: 28.2 PG (ref 26.7–34)
MEAN CORPUSCULAR HGB CONC: 31.9 G/DL (ref 29.9–35.2)
PLATELET COUNT: 261 10^3/UL (ref 150–450)
POTASSIUM: 4.3 MMOL/L (ref 3.5–5.1)
RED BLOOD COUNT: 4.15 10^6/UL (ref 4.2–5.4)
SODIUM: 139 MMOL/L (ref 136–145)
WHITE BLOOD COUNT: 7.1 10^3/UL (ref 4–11)

## 2024-05-03 PROCEDURE — 36415 COLL VENOUS BLD VENIPUNCTURE: CPT

## 2024-05-03 PROCEDURE — 93005 ELECTROCARDIOGRAM TRACING: CPT

## 2024-05-03 PROCEDURE — 84484 ASSAY OF TROPONIN QUANT: CPT

## 2024-05-03 PROCEDURE — 99285 EMERGENCY DEPT VISIT HI MDM: CPT

## 2024-05-03 PROCEDURE — 83735 ASSAY OF MAGNESIUM: CPT

## 2024-05-03 PROCEDURE — 80048 BASIC METABOLIC PNL TOTAL CA: CPT

## 2024-05-03 PROCEDURE — 85025 COMPLETE CBC W/AUTO DIFF WBC: CPT

## 2024-05-03 PROCEDURE — 71045 X-RAY EXAM CHEST 1 VIEW: CPT

## 2024-06-16 DIAGNOSIS — R56.9 UNSPECIFIED CONVULSIONS (MULTI): ICD-10-CM

## 2024-06-17 RX ORDER — LAMOTRIGINE 100 MG/1
TABLET ORAL 2 TIMES DAILY
Qty: 60 TABLET | Refills: 11 | Status: SHIPPED | OUTPATIENT
Start: 2024-06-17

## 2024-10-16 ENCOUNTER — APPOINTMENT (OUTPATIENT)
Dept: NEUROLOGY | Facility: CLINIC | Age: 29
End: 2024-10-16
Payer: COMMERCIAL

## 2024-10-16 VITALS
TEMPERATURE: 96.9 F | HEART RATE: 67 BPM | HEIGHT: 69 IN | DIASTOLIC BLOOD PRESSURE: 82 MMHG | SYSTOLIC BLOOD PRESSURE: 136 MMHG | BODY MASS INDEX: 28.23 KG/M2 | WEIGHT: 190.6 LBS

## 2024-10-16 DIAGNOSIS — R56.9 SEIZURE (MULTI): Primary | ICD-10-CM

## 2024-10-16 PROCEDURE — 99214 OFFICE O/P EST MOD 30 MIN: CPT | Performed by: STUDENT IN AN ORGANIZED HEALTH CARE EDUCATION/TRAINING PROGRAM

## 2024-10-16 PROCEDURE — 3075F SYST BP GE 130 - 139MM HG: CPT | Performed by: STUDENT IN AN ORGANIZED HEALTH CARE EDUCATION/TRAINING PROGRAM

## 2024-10-16 PROCEDURE — 1036F TOBACCO NON-USER: CPT | Performed by: STUDENT IN AN ORGANIZED HEALTH CARE EDUCATION/TRAINING PROGRAM

## 2024-10-16 PROCEDURE — 3008F BODY MASS INDEX DOCD: CPT | Performed by: STUDENT IN AN ORGANIZED HEALTH CARE EDUCATION/TRAINING PROGRAM

## 2024-10-16 PROCEDURE — 3079F DIAST BP 80-89 MM HG: CPT | Performed by: STUDENT IN AN ORGANIZED HEALTH CARE EDUCATION/TRAINING PROGRAM

## 2024-10-16 RX ORDER — LABETALOL 200 MG/1
200 TABLET, FILM COATED ORAL 2 TIMES DAILY
COMMUNITY

## 2024-10-16 ASSESSMENT — LIFESTYLE VARIABLES
AUDIT-C TOTAL SCORE: 0
HOW OFTEN DO YOU HAVE A DRINK CONTAINING ALCOHOL: NEVER
SKIP TO QUESTIONS 9-10: 1
HOW OFTEN DO YOU HAVE SIX OR MORE DRINKS ON ONE OCCASION: NEVER
HOW MANY STANDARD DRINKS CONTAINING ALCOHOL DO YOU HAVE ON A TYPICAL DAY: PATIENT DOES NOT DRINK

## 2024-10-16 ASSESSMENT — PATIENT HEALTH QUESTIONNAIRE - PHQ9
SUM OF ALL RESPONSES TO PHQ9 QUESTIONS 1 & 2: 0
2. FEELING DOWN, DEPRESSED OR HOPELESS: NOT AT ALL
1. LITTLE INTEREST OR PLEASURE IN DOING THINGS: NOT AT ALL

## 2024-10-16 NOTE — PROGRESS NOTES
[facility]   Referring: No ref. provider found  PCP: Nory Dias MD  No chief complaint on file.      Gracie Villarreal is a 29 y.o. year old female presenting for visit for follow-up .     She was last seen 10/13/2023. Since her last visit, she gave birth to a baby girl. She is not breastfeeding. She is on lamotrigine 100mg BID. She is not on birth control. We did not make any adjustments to her lamotrigine while pregnant. She has had no further seizures. She is now working for [employer] as the health department wanted to transition her to the care home system.     Patient Active Problem List   Diagnosis    Anxiety    Benign essential hypertension    Seizure (Multi)    Vitamin B12 deficiency    Hypertension       Objective   Neurological Exam  Mental Status  Awake, alert and oriented to person, place and time. Speech is normal.    Cranial Nerves  CN II: Visual fields full to confrontation.  CN III, IV, VI: Extraocular movements intact bilaterally.  CN V: Facial sensation is normal.  CN VII: Full and symmetric facial movement.  CN VIII: Hearing is normal.  CN IX, X: Palate elevates symmetrically  CN XI: Shoulder shrug strength is normal.  CN XII: Tongue midline without atrophy or fasciculations.    Motor   Strength is 5/5 throughout all four extremities.    Sensory  Light touch is normal in upper and lower extremities.     Coordination  Right: Finger-to-nose normal.Left: Finger-to-nose normal.    Physical Exam  Eyes:      Extraocular Movements: Extraocular movements intact.   Neurological:      Motor: Motor strength is normal.  Psychiatric:         Speech: Speech normal.     Assessment/Plan   Diagnoses and all orders for this visit:  Seizure (Multi)    Seizure disorder: Two seizures 10/1/2020 and 11/17/2020. MRI showed no lesion. EEG was normal. Doing well on lamotrigine 100mg BID. Will repeat level now that she is post partum. Plans to have children in the future,  discussed to let us know if she is pregnant again so we can follow lamotrigine levels and adjust accordingly. Ok to drive. Reviewed medications that can lower the seizure threshold.     Follow-up in 1 year     There are no Patient Instructions on file for this visit.